# Patient Record
Sex: MALE | Race: WHITE
[De-identification: names, ages, dates, MRNs, and addresses within clinical notes are randomized per-mention and may not be internally consistent; named-entity substitution may affect disease eponyms.]

---

## 2018-03-10 ENCOUNTER — HOSPITAL ENCOUNTER (EMERGENCY)
Dept: HOSPITAL 4 - SED | Age: 28
Discharge: HOME | End: 2018-03-10
Payer: MEDICAID

## 2018-03-10 VITALS — SYSTOLIC BLOOD PRESSURE: 149 MMHG

## 2018-03-10 VITALS — HEIGHT: 71 IN | BODY MASS INDEX: 37.1 KG/M2 | WEIGHT: 265 LBS

## 2018-03-10 VITALS — SYSTOLIC BLOOD PRESSURE: 130 MMHG

## 2018-03-10 DIAGNOSIS — J02.9: Primary | ICD-10-CM

## 2018-03-10 NOTE — NUR
Patient given written and verbal discharge instructions and verbalizes 
understanding.  ER MD discussed with patient the results and treatment 
provided. Patient in stable condition. ID arm band removed. 

Rx of chloraseptic throat spray and tylenol with codeine  given. Patient 
educated on pain management and to follow up with PMD. Pain Scale .

Opportunity for questions provided and answered. Medication side effect fact 
sheet provided. Pt discharged home with mom by ambulating to the car.

## 2018-03-10 NOTE — NUR
-------------------------------------------------------------------------------

           *** Note yash in ED - 03/10/18 at 1616 by [email or web address] ***            

-------------------------------------------------------------------------------

Presents with 2 week history of cough, fever, chills. Did not go to PCP. Was 
running a fever but not now. Now he just has a cough. States throat more sore 
at night. Able to swallow ok and eat. Phlegm is clear.

## 2018-03-10 NOTE — NUR
Pt presents with 2 week history of cough after he had the flu. Curently no 
temp. Sates his cough is worse at night. Sputum is clear. Did not see his PCP.